# Patient Record
Sex: FEMALE | Race: WHITE | Employment: FULL TIME | URBAN - METROPOLITAN AREA
[De-identification: names, ages, dates, MRNs, and addresses within clinical notes are randomized per-mention and may not be internally consistent; named-entity substitution may affect disease eponyms.]

---

## 2017-02-15 DIAGNOSIS — E03.9 HYPOTHYROIDISM: ICD-10-CM

## 2017-02-15 NOTE — TELEPHONE ENCOUNTER
Pending Prescriptions:                       Disp   Refills    levothyroxine (SYNTHROID/LEVOTHROID) 75 M*90 tab*0            Sig: Take 1 tablet (75 mcg) by mouth daily      Last OV was 12/4/15. Due for AE and thyroid labs. TC to patient. Scheduled AE on 3/13/17. Pt ok on refills right now.     Farida Sanchez RN-BSN      TSH   Date Value Ref Range Status   12/04/2015 2.40 0.40 - 4.00 mU/L Final   ]

## 2017-03-13 ENCOUNTER — OFFICE VISIT (OUTPATIENT)
Dept: MIDWIFE SERVICES | Facility: CLINIC | Age: 42
End: 2017-03-13
Payer: COMMERCIAL

## 2017-03-13 VITALS
SYSTOLIC BLOOD PRESSURE: 108 MMHG | DIASTOLIC BLOOD PRESSURE: 60 MMHG | TEMPERATURE: 97.1 F | BODY MASS INDEX: 19.67 KG/M2 | HEIGHT: 68 IN | WEIGHT: 129.8 LBS

## 2017-03-13 DIAGNOSIS — Z01.419 WELL WOMAN EXAM WITH ROUTINE GYNECOLOGICAL EXAM: Primary | ICD-10-CM

## 2017-03-13 DIAGNOSIS — E06.3 HYPOTHYROIDISM DUE TO HASHIMOTO'S THYROIDITIS: ICD-10-CM

## 2017-03-13 LAB — DEPRECATED CALCIDIOL+CALCIFEROL SERPL-MC: 27 UG/L (ref 20–75)

## 2017-03-13 PROCEDURE — 83718 ASSAY OF LIPOPROTEIN: CPT | Performed by: ADVANCED PRACTICE MIDWIFE

## 2017-03-13 PROCEDURE — 82306 VITAMIN D 25 HYDROXY: CPT | Performed by: ADVANCED PRACTICE MIDWIFE

## 2017-03-13 PROCEDURE — 36415 COLL VENOUS BLD VENIPUNCTURE: CPT | Performed by: ADVANCED PRACTICE MIDWIFE

## 2017-03-13 PROCEDURE — 99396 PREV VISIT EST AGE 40-64: CPT | Performed by: ADVANCED PRACTICE MIDWIFE

## 2017-03-13 PROCEDURE — 84443 ASSAY THYROID STIM HORMONE: CPT | Performed by: ADVANCED PRACTICE MIDWIFE

## 2017-03-13 PROCEDURE — 82465 ASSAY BLD/SERUM CHOLESTEROL: CPT | Performed by: ADVANCED PRACTICE MIDWIFE

## 2017-03-13 NOTE — PROGRESS NOTES
Lilly is a 41 year old  female who presents for annual exam.     Menses are absent lasting 0 days.  Menses flow: light.  No LMP recorded. Patient is not currently having periods (Reason: IUD).. Using IUD for contraception.  She is not currently considering pregnancy.  Besides routine health maintenance, she has no other health concerns today .  Would like thryoid checked today. Likes to continue with Mirena.   GYNECOLOGIC HISTORY:  Menarche: 14  Age at first intercourse: 19-20 Number of lifetime partners: <6  Lilly is sexually active with 1 male partner(s) and is currently in monogamous relationship.    History sexually transmitted infections:No STD history  STI testing offered?  Declined  LEORA exposure: Unknown  History of abnormal Pap smear: NO - age 30-65 PAP every 5 years with negative HPV co-testing recommended  Family history of breast CA: No  Family history of uterine/ovarian CA: No    Family history of colon CA: No    HEALTH MAINTENANCE:  Cholesterol: (  Cholesterol   Date Value Ref Range Status   2015 140 <200 mg/dL Final   2007 167 0 - 200 mg/dL Final     Comment:     LDL Cholesterol is the primary guide to therapy: LDL-cholesterol goal in high   risk patients is <100 mg/dL and in very high risk patients is <70 mg/dL.   The NCEP recommends further evaluation of: patients with cholesterol <200 mg/dL   if additional risk factors are present, cholesterol >240 mg/dL, triglycerides   >150 mg/dL, or HDL <40 mg/dL.    History of abnormal lipids: No  Mammo: N/A . History of abnormal Mammo: Not applicable.  Regular Self Breast Exams: every other month  Calcium/Vitamin D intake: source:  diet Adequate? Yes  TSH: (  TSH   Date Value Ref Range Status   2015 2.40 0.40 - 4.00 mU/L Final    )  Pap; (  Lab Results   Component Value Date    PAP NIL 2015    PAP NIL 2010    PAP NIL 2008    )    HISTORY:  Obstetric History       T3      TAB0   SAB0   E0   M0   L3       #  Outcome Date GA Lbr Lion/2nd Weight Sex Delivery Anes PTL Lv   3 Term 08 39w0d 07:00 8 lb 5 oz (3.771 kg) F  None  Y      Name: Francia   2 Term 05 39w0d  8 lb 3 oz (3.714 kg) F    Y      Name: Luiza   1 Term 10/30/03 39w0d 07:00 7 lb (3.175 kg) F  IV REGIONAL  Y      Name: Leif      Obstetric Comments   S/P breast surgery. Breast fed without difficulty.     Past Medical History   Diagnosis Date     Diffuse cystic mastopathy      last cysts aspiration       Unspecified hypothyroidism           Past Surgical History   Procedure Laterality Date     C nonspecific procedure  10/30/03     Vaginal delivery     C nonspecific procedure  1995     Right breast, multiple bilateral cysts aspiration last      C nonspecific procedure       Left breast tissue removed-probably fibrous tissue     Family History   Problem Relation Age of Onset     GASTROINTESTINAL DISEASE Mother      ibs     Hypertension Father      Lipids Father      Thyroid Disease Father      Depression Father      DIABETES Paternal Grandmother      Depression Brother      Social History     Social History     Marital status:      Spouse name: N/A     Number of children: N/A     Years of education: N/A     Social History Main Topics     Smoking status: Never Smoker     Smokeless tobacco: Not on file     Alcohol use Yes      Comment: occ     Drug use: No     Sexual activity: Yes     Partners: Male     Birth control/ protection: IUD     Other Topics Concern     Parent/Sibling W/ Cabg, Mi Or Angioplasty Before 65f 55m? No     Social History Narrative    Caffeine intake/servings daily - 0    Calcium intake/servings daily - 1-2    Exercise 3-4 times weekly - describe spin, weight    Sunscreen used - Yes    Seatbelts used - Yes    Guns stored in the home - No    Self Breast Exam - Yes    Pap test up to date -  Yes    Eye exam up to date -  No    Dental exam up to date -  Yes    DEXA scan up to date -  Not Applicable    Flex  "Sig/Colonoscopy up to date -  Not Applicable    Mammography up to date -  Yes, 2005    Immunizations reviewed and up to date - Yes    Abuse: Current or Past (Physical, Sexual or Emotional) - No    Do you feel safe in your environment - Yes    Do you cope well with stress - Yes, Most times    Do you suffer from insomnia - No    Last updated by: Maude Moscoso  1/27/2010               Current Outpatient Prescriptions:      levothyroxine (SYNTHROID, LEVOTHROID) 75 MCG tablet, Take 1 tablet (75 mcg) by mouth daily, Disp: 90 tablet, Rfl: 3     IBUPROFEN, 1 tablet as needed., Disp: , Rfl:      MIRENA 20 MCG/24HR IU IUD, insert one into uterus for wonderful contraceptive and non-contraceptive benefits, Disp: one, Rfl: 0     [DISCONTINUED] LEVOTHYROXINE SODIUM 88 MCG OR TABS, ONE TABLET DAILY IN THE MORNING, Disp: 90 tab, Rfl: 3     Allergies   Allergen Reactions     No Known Drug Allergies        Past medical, surgical, social and family history were reviewed and updated in EPIC.    ROS:   C:     NEGATIVE for fever, chills, change in weight  I:       NEGATIVE for worrisome rashes, moles or lesions  E:     NEGATIVE for vision changes or irritation  E/M: NEGATIVE for ear, mouth and throat problems  R:     NEGATIVE for significant cough or SOB  CV:   NEGATIVE for chest pain, palpitations or peripheral edema  GI:     NEGATIVE for nausea, abdominal pain, heartburn, or change in bowel habits  :   NEGATIVE for frequency, dysuria, hematuria, vaginal discharge, or irregular bleeding  M:     NEGATIVE for significant arthralgias or myalgia  N:      NEGATIVE for weakness, dizziness or paresthesias  E:      NEGATIVE for temperature intolerance, skin/hair changes  P:      NEGATIVE for changes in mood or affect.    EXAM:  /60  Temp 97.1  F (36.2  C) (Oral)  Ht 5' 7.5\" (1.715 m)  Wt 129 lb 12.8 oz (58.9 kg)  BMI 20.03 kg/m2   BMI: Body mass index is 20.03 kg/(m^2).  Constitutional: healthy, alert and no distress  Head: " Normocephalic. No masses, lesions, tenderness or abnormalities  Neck: Neck supple. Trachea midline. No adenopathy. Thyroid symmetric, normal size.   Cardiovascular: RRR.   Respiratory: Negative.   Breast: Breasts reveal mild symmetric fibrocystic densities, but there are no dominant, discrete, fixed or suspicious masses found.  Gastrointestinal: Abdomen soft, non-tender, non-distended. No masses, organomegaly.  :  Vulva:  No external lesions, normal female hair distribution, no inguinal adenopathy.    Urethra:  Midline, non-tender, well supported, no discharge  Vagina:  Moist, pink, no abnormal discharge, no lesions  Uterus:  Normal size, anteverted , non-tender, freely mobile  Ovaries:  No masses appreciated, non-tender, mobile  Cervix; IUD strings visualized normal size  Rectal Exam: deferred  Musculoskeletal: extremities normal  Skin: no suspicious lesions or rashes  Psychiatric: Affect appropriate, cooperative,mentation appears normal.     COUNSELING:   Reviewed preventive health counseling, as reflected in patient instructions  Special attention given to:        Regular exercise       Healthy diet/nutrition       (Ashley)menopause management   reports that she has never smoked. She has never used smokeless tobacco.    Body mass index is 20.03 kg/(m^2).    FRAX Risk Assessment    ASSESSMENT:  41 year old female with satisfactory annual exam  (Z01.419) Well woman exam with routine gynecological exam  (primary encounter diagnosis)    Plan: Vitamin D Deficiency, Cholesterol, HDL         cholesterol            (E03.8,  E06.3) Hypothyroidism due to Hashimoto's thyroiditi  Plan: TSH with free T4 reflex            Patient would like TSH tested, stable on her thyroid meds but states have not had it tested in a while.  Wanted to know about when IUD needed to be taken out and has hx of heavy bleeding reviewed that used for perimenopausal bleeding and ok to have replaced in 2020.    Mammogram at [facility].?

## 2017-03-13 NOTE — NURSING NOTE
"Chief Complaint   Patient presents with     Physical       Initial /60  Temp 97.1  F (36.2  C) (Oral)  Ht 5' 7.5\" (1.715 m)  Wt 129 lb 12.8 oz (58.9 kg)  BMI 20.03 kg/m2 Estimated body mass index is 20.03 kg/(m^2) as calculated from the following:    Height as of this encounter: 5' 7.5\" (1.715 m).    Weight as of this encounter: 129 lb 12.8 oz (58.9 kg).  BP completed using cuff size: regular        The following HM Due: mammogram  Vaccinations: tdap      The following patient reported/Care Every where data was sent to:  P ABSTRACT QUALITY INITIATIVES [[id number]]       patient has appointment for today    Shagufta Gonzalez CMA               "

## 2017-03-13 NOTE — MR AVS SNAPSHOT
"              After Visit Summary   3/13/2017    Lilly Peres    MRN: [id number]           Patient Information     Date Of Birth          1975        Visit Information        Provider Department      3/13/2017 3:30 PM Bernice Freeman APRN CNM [facility] [place]        Today's Diagnoses     Well woman exam with routine gynecological exam    -  1    Hypothyroidism due to Hashimoto's thyroiditis           Follow-ups after your visit        Who to contact     If you have questions or need follow up information about today's clinic visit or your schedule please contact [facility] [place] directly at [phone].  Normal or non-critical lab and imaging results will be communicated to you by Blendagramhart, letter or phone within 4 business days after the clinic has received the results. If you do not hear from us within 7 days, please contact the clinic through Blendagramhart or phone. If you have a critical or abnormal lab result, we will notify you by phone as soon as possible.  Submit refill requests through ADVIZE or call your pharmacy and they will forward the refill request to us. Please allow 3 business days for your refill to be completed.          Additional Information About Your Visit        MyChart Information     ADVIZE gives you secure access to your electronic health record. If you see a primary care provider, you can also send messages to your care team and make appointments. If you have questions, please call your primary care clinic.  If you do not have a primary care provider, please call [phone] and they will assist you.        Care EveryWhere ID     This is your Care EveryWhere ID. This could be used by other organizations to access your [place] medical records  [id number]        Your Vitals Were     Temperature Height BMI (Body Mass Index)             97.1  F (36.2  C) (Oral) 5' 7.5\" (1.715 m) 20.03 kg/m2          Blood Pressure from Last 3 Encounters:   03/13/17 108/60 "   12/04/15 104/64   04/22/11 102/60    Weight from Last 3 Encounters:   03/13/17 129 lb 12.8 oz (58.9 kg)   12/04/15 127 lb 12.8 oz (58 kg)   04/22/11 130 lb (59 kg)              We Performed the Following     Cholesterol     HDL cholesterol     TSH with free T4 reflex     Vitamin D Deficiency        Primary Care Provider    None Specified       No primary provider on file.        Thank you!     Thank you for choosing [facility] [place]  for your care. Our goal is always to provide you with excellent care. Hearing back from our patients is one way we can continue to improve our services. Please take a few minutes to complete the written survey that you may receive in the mail after your visit with us. Thank you!             Your Updated Medication List - Protect others around you: Learn how to safely use, store and throw away your medicines at www.disposemymeds.org.          This list is accurate as of: 3/13/17  7:01 PM.  Always use your most recent med list.                   Brand Name Dispense Instructions for use    IBUPROFEN      1 tablet as needed.       levothyroxine 75 MCG tablet    SYNTHROID/LEVOTHROID    90 tablet    Take 1 tablet (75 mcg) by mouth daily       MIRENA (52 MG) 20 MCG/24HR IUD   Generic drug:  levonorgestrel     one    insert one into uterus for wonderful contraceptive and non-contraceptive benefits

## 2017-03-14 DIAGNOSIS — E03.9 HYPOTHYROIDISM: ICD-10-CM

## 2017-03-14 LAB
CHOLEST SERPL-MCNC: 158 MG/DL
HDLC SERPL-MCNC: 58 MG/DL
TSH SERPL DL<=0.005 MIU/L-ACNC: 1.42 MU/L (ref 0.4–4)

## 2017-03-14 RX ORDER — LEVOTHYROXINE SODIUM 75 UG/1
75 TABLET ORAL DAILY
Qty: 90 TABLET | Refills: 3 | Status: SHIPPED | OUTPATIENT
Start: 2017-03-14 | End: 2018-04-03

## 2017-03-14 NOTE — TELEPHONE ENCOUNTER
Signed Prescriptions:                        Disp   Refills    levothyroxine (SYNTHROID/LEVOTHROID) 75 MC*90 tab*3        Sig: Take 1 tablet (75 mcg) by mouth daily  Authorizing Provider: SOLOMON NNUEZ  Ordering User: HAYDE NATION    TSH was normal. Rx sent.  Hayde Nation

## 2017-09-27 ENCOUNTER — TRANSFERRED RECORDS (OUTPATIENT)
Dept: HEALTH INFORMATION MANAGEMENT | Facility: CLINIC | Age: 42
End: 2017-09-27

## 2018-04-03 DIAGNOSIS — E03.9 HYPOTHYROIDISM: ICD-10-CM

## 2018-05-03 RX ORDER — LEVOTHYROXINE SODIUM 75 UG/1
TABLET ORAL
Qty: 90 TABLET | Refills: 0 | Status: SHIPPED | OUTPATIENT
Start: 2018-05-03

## 2018-06-07 ENCOUNTER — OFFICE VISIT (OUTPATIENT)
Dept: MIDWIFE SERVICES | Facility: CLINIC | Age: 43
End: 2018-06-07
Payer: COMMERCIAL

## 2018-06-07 VITALS
HEIGHT: 68 IN | WEIGHT: 132.6 LBS | SYSTOLIC BLOOD PRESSURE: 105 MMHG | HEART RATE: 69 BPM | TEMPERATURE: 98.1 F | DIASTOLIC BLOOD PRESSURE: 66 MMHG | BODY MASS INDEX: 20.1 KG/M2

## 2018-06-07 DIAGNOSIS — Z01.419 ENCOUNTER FOR WELL WOMAN EXAM WITH ROUTINE GYNECOLOGICAL EXAM: ICD-10-CM

## 2018-06-07 DIAGNOSIS — E03.8 OTHER SPECIFIED HYPOTHYROIDISM: Primary | ICD-10-CM

## 2018-06-07 PROCEDURE — 36415 COLL VENOUS BLD VENIPUNCTURE: CPT | Performed by: ADVANCED PRACTICE MIDWIFE

## 2018-06-07 PROCEDURE — 84443 ASSAY THYROID STIM HORMONE: CPT | Performed by: ADVANCED PRACTICE MIDWIFE

## 2018-06-07 PROCEDURE — 99396 PREV VISIT EST AGE 40-64: CPT | Performed by: ADVANCED PRACTICE MIDWIFE

## 2018-06-07 RX ORDER — LEVOTHYROXINE SODIUM 75 UG/1
75 TABLET ORAL DAILY
Qty: 90 TABLET | Refills: 3 | Status: SHIPPED | OUTPATIENT
Start: 2018-06-07

## 2018-06-07 NOTE — NURSING NOTE
"Chief Complaint   Patient presents with     Physical     annual, spotting in mid April, patient has IUD.        Initial /66  Pulse 69  Temp 98.1  F (36.7  C) (Oral)  Ht 5' 7.5\" (1.715 m)  Wt 132 lb 9.6 oz (60.1 kg)  LMP   BMI 20.46 kg/m2 Estimated body mass index is 20.46 kg/(m^2) as calculated from the following:    Height as of this encounter: 5' 7.5\" (1.715 m).    Weight as of this encounter: 132 lb 9.6 oz (60.1 kg).  BP completed using cuff size: regular        The following HM Due: mammogram      The following patient reported/Care Every where data was sent to:  P ABSTRACT QUALITY INITIATIVES [[id number]]  Mammogram done on this date: 17 (approximately), by this group: [facility] , results were Normal      patient has appointment for today  Julienne Valdez                "

## 2018-06-07 NOTE — PROGRESS NOTES
Lilly is a 42 year old  female who presents for annual exam.     Menses are rare and iud has some spotting here and there and NA lasting NA days.  Menses flow: NA.  No LMP recorded. Patient is not currently having periods (Reason: IUD).. Using IUD for contraception.  She is not currently considering pregnancy.  Besides routine health maintenance,  she would like to discuss spotting after iud.  Issues with breasts being different sizes.  Feels somewhat resolved.    May want to change to CN at [facility] because closer to home, has young daughters just starting period and had period in April first time in 3 years with Rizwan.      GYNECOLOGIC HISTORY:  Menarche: 14  Age at first intercourse: 19-20 Number of lifetime partners: <6  Lilly is sexually active with male partner(s) and is currently in monogamous relationship with .    History sexually transmitted infections:No STD history  STI testing offered?  Declined  LEORA exposure: Unknown  History of abnormal Pap smear: NO - age 30- 65 PAP every 3 years recommended  Family history of breast CA: Yes (Please explain): maternal aunt  Family history of uterine/ovarian CA: No    Family history of colon CA: No    HEALTH MAINTENANCE:  Cholesterol: (  Cholesterol   Date Value Ref Range Status   2017 158 <200 mg/dL Final   2015 140 <200 mg/dL Final    History of abnormal lipids: No  Mammo: 2017. History of abnormal Mammo: YES, had a cyst but came back negative.  Regular Self Breast Exams: Yes  Calcium/Vitamin D intake: source:  veggies Adequate? Yes  TSH: (  TSH   Date Value Ref Range Status   2017 1.42 0.40 - 4.00 mU/L Final    )  Pap; (  Lab Results   Component Value Date    PAP NIL 2015    PAP NIL 2010    PAP NIL 2008    )    HISTORY:  Obstetric History       T3      L3     SAB0   TAB0   Ectopic0   Multiple0   Live Births3       # Outcome Date GA Lbr Lion/2nd Weight Sex Delivery Anes PTL Lv   3 Term 08  39w0d 07:00 8 lb 5 oz (3.771 kg) F  None  YOVANNY      Name: Francia   2 Term 05 39w0d  8 lb 3 oz (3.714 kg) F    YOVANNY      Name: Luiza   1 Term 10/30/03 39w0d 07:00 7 lb (3.175 kg) F  IV REGIONAL  YOVANNY      Name: Leif      Obstetric Comments   S/P breast surgery. Breast fed without difficulty.     Past Medical History:   Diagnosis Date     Diffuse cystic mastopathy     last cysts aspiration       Unspecified hypothyroidism          Past Surgical History:   Procedure Laterality Date     C NONSPECIFIC PROCEDURE  10/30/03    Vaginal delivery     C NONSPECIFIC PROCEDURE  1995    Right breast, multiple bilateral cysts aspiration last      C NONSPECIFIC PROCEDURE      Left breast tissue removed-probably fibrous tissue     Family History   Problem Relation Age of Onset     GASTROINTESTINAL DISEASE Mother      ibs     Hypertension Father      Lipids Father      Thyroid Disease Father      Depression Father      DIABETES Paternal Grandmother      Depression Brother      Social History     Social History     Marital status:      Spouse name: N/A     Number of children: N/A     Years of education: N/A     Social History Main Topics     Smoking status: Never Smoker     Smokeless tobacco: Never Used     Alcohol use Yes      Comment: occ     Drug use: No     Sexual activity: Yes     Partners: Male     Birth control/ protection: IUD      Comment: Mirena.      Other Topics Concern     Parent/Sibling W/ Cabg, Mi Or Angioplasty Before 65f 55m? No     Social History Narrative    Caffeine intake/servings daily - 0    Calcium intake/servings daily - 1-2    Exercise 3-4 times weekly - describe spin, weight    Sunscreen used - Yes    Seatbelts used - Yes    Guns stored in the home - No    Self Breast Exam - Yes    Pap test up to date -  Yes    Eye exam up to date -  No    Dental exam up to date -  Yes    DEXA scan up to date -  Not Applicable    Flex Sig/Colonoscopy up to date -  Not Applicable     "Mammography up to date -  Yes, 2005    Immunizations reviewed and up to date - Yes    Abuse: Current or Past (Physical, Sexual or Emotional) - No    Do you feel safe in your environment - Yes    Do you cope well with stress - Yes, Most times    Do you suffer from insomnia - No    Last updated by: Maude Moscoso  1/27/2010               Current Outpatient Prescriptions:      IBUPROFEN, 1 tablet as needed., Disp: , Rfl:      levothyroxine (SYNTHROID/LEVOTHROID) 75 MCG tablet, TAKE 1 TABLET BY MOUTH DAILY, Disp: 90 tablet, Rfl: 0     MIRENA 20 MCG/24HR IU IUD, insert one into uterus for wonderful contraceptive and non-contraceptive benefits, Disp: one, Rfl: 0     Allergies   Allergen Reactions     No Known Drug Allergies        Past medical, surgical, social and family history were reviewed and updated in EPIC.    ROS:   C:     NEGATIVE for fever, chills, change in weight  I:       NEGATIVE for worrisome rashes, moles or lesions  E:     NEGATIVE for vision changes or irritation  E/M: NEGATIVE for ear, mouth and throat problems  R:     NEGATIVE for significant cough or SOB  CV:   NEGATIVE for chest pain, palpitations or peripheral edema  GI:     NEGATIVE for nausea, abdominal pain, heartburn, or change in bowel habits  :   NEGATIVE for frequency, dysuria, hematuria, vaginal discharge, or irregular bleeding  M:     NEGATIVE for significant arthralgias or myalgia  N:      NEGATIVE for weakness, dizziness or paresthesias  E:      NEGATIVE for temperature intolerance, skin/hair changes  P:      NEGATIVE for changes in mood or affect.    EXAM:  /66  Pulse 69  Temp 98.1  F (36.7  C) (Oral)  Ht 5' 7.5\" (1.715 m)  Wt 132 lb 9.6 oz (60.1 kg)  LMP   BMI 20.46 kg/m2   BMI: Body mass index is 20.46 kg/(m^2).  Constitutional: healthy, alert and no distress  Head: Normocephalic. No masses, lesions, tenderness or abnormalities  Neck: Neck supple. Trachea midline. No adenopathy. Thyroid symmetric, normal size. "   Cardiovascular: RRR.   Respiratory: Negative.   Breast: Breasts reveal mild symmetric fibrocystic densities, but there are no dominant, discrete, fixed or suspicious masses found.  Gastrointestinal: Abdomen soft, non-tender, non-distended. No masses, organomegaly.  :  Vulva:  No external lesions, normal female hair distribution, no inguinal adenopathy.    Urethra:  Midline, non-tender, well supported, no discharge  Vagina:  Moist, pink, no abnormal discharge, no lesions  Uterus:  Normal size, retroverted , non-tender, freely mobile  Ovaries:  No masses appreciated, non-tender on right slightly  mobile  Rectal Exam: deferred  Musculoskeletal: extremities normal  Skin: no suspicious lesions or rashes  Psychiatric: Affect appropriate, cooperative,mentation appears normal.     COUNSELING:   Reviewed preventive health counseling, as reflected in patient instructions  Special attention given to:        (Ashley)menopause management   reports that she has never smoked. She has never used smokeless tobacco.    Body mass index is 20.46 kg/(m^2).    FRAX Risk Assessment    ASSESSMENT:  42 year old female with satisfactory annual exam  (E03.8) Other specified hypothyroidism  (primary encounter diagnosis)  Comment:  Plan: levothyroxine (SYNTHROID/LEVOTHROID) 75 MCG         tablet, TSH with free T4 reflex        Would like refill of levothroid medicine feels stable on this dose but thinks also wants TSH drawn today to see level. Some occasional perimenopausal symptoms but all ok.   Discussed young teen children and it they should see provider, is considering [facility] because close and enc. To see CNM service there.     rtc as needed.

## 2018-06-07 NOTE — MR AVS SNAPSHOT
"              After Visit Summary   6/7/2018    Lilly Peres    MRN: [id number]           Patient Information     Date Of Birth          1975        Visit Information        Provider Department      6/7/2018 2:00 PM Bernice Freeman APRN CN[facility]        Today's Diagnoses     Other specified hypothyroidism    -  1    Encounter for well woman exam with routine gynecological exam           Follow-ups after your visit        Who to contact     If you have questions or need follow up information about today's clinic visit or your schedule please contact [facility] directly at [phone].  Normal or non-critical lab and imaging results will be communicated to you by Captricityhart, letter or phone within 4 business days after the clinic has received the results. If you do not hear from us within 7 days, please contact the clinic through Mirage Network[employer] or phone. If you have a critical or abnormal lab result, we will notify you by phone as soon as possible.  Submit refill requests through Beyond the Box or call your pharmacy and they will forward the refill request to us. Please allow 3 business days for your refill to be completed.          Additional Information About Your Visit        MyChart Information     Beyond the Box gives you secure access to your electronic health record. If you see a primary care provider, you can also send messages to your care team and make appointments. If you have questions, please call your primary care clinic.  If you do not have a primary care provider, please call [phone] and they will assist you.        Care EveryWhere ID     This is your Care EveryWhere ID. This could be used by other organizations to access your [facility] medical records  [id number]        Your Vitals Were     Pulse Temperature Height BMI (Body Mass Index)          69 98.1  F (36.7  C) (Oral) 5' 7.5\" (1.715 m) 20.46 kg/m2         Blood Pressure from Last 3 Encounters:   06/07/18 105/66 "   03/13/17 108/60   12/04/15 104/64    Weight from Last 3 Encounters:   06/07/18 132 lb 9.6 oz (60.1 kg)   03/13/17 129 lb 12.8 oz (58.9 kg)   12/04/15 127 lb 12.8 oz (58 kg)              We Performed the Following     TSH with free T4 reflex          Today's Medication Changes          These changes are accurate as of 6/7/18  3:17 PM.  If you have any questions, ask your nurse or doctor.               These medicines have changed or have updated prescriptions.        Dose/Directions    * levothyroxine 75 MCG tablet   Commonly known as:  SYNTHROID/LEVOTHROID   This may have changed:  Another medication with the same name was added. Make sure you understand how and when to take each.   Used for:  Hypothyroidism        TAKE 1 TABLET BY MOUTH DAILY   Quantity:  90 tablet   Refills:  0       * levothyroxine 75 MCG tablet   Commonly known as:  SYNTHROID/LEVOTHROID   This may have changed:  You were already taking a medication with the same name, and this prescription was added. Make sure you understand how and when to take each.   Used for:  Other specified hypothyroidism        Dose:  75 mcg   Take 1 tablet (75 mcg) by mouth daily   Quantity:  90 tablet   Refills:  3       * Notice:  This list has 2 medication(s) that are the same as other medications prescribed for you. Read the directions carefully, and ask your doctor or other care provider to review them with you.         Where to get your medicines      These medications were sent to [place] [address] [facility] - [place], [place] - [address]  [address] [facility] [address]     Phone:  [phone]     levothyroxine 75 MCG tablet                Primary Care Provider Office Phone # Fax #    [facility] [phone]       [address] [facility] [address]  [facility] [address]        Equal Access to Services     CHARLENE ARROYO AH: keith Lees, julia jordan. So [facility]  [phone].    ATENCIÓN: Si lorin gallardo, tiene a almanzar disposición servicios gratuitos de asistencia lingüística. Donato andrade [phone].    We comply with applicable federal civil rights laws and [place] laws. We do not discriminate on the basis of race, color, national origin, age, disability, sex, sexual orientation, or gender identity.            Thank you!     Thank you for choosing [facility]  for your care. Our goal is always to provide you with excellent care. Hearing back from our patients is one way we can continue to improve our services. Please take a few minutes to complete the written survey that you may receive in the mail after your visit with us. Thank you!             Your Updated Medication List - Protect others around you: Learn how to safely use, store and throw away your medicines at www.disposemymeds.org.          This list is accurate as of 6/7/18  3:17 PM.  Always use your most recent med list.                   Brand Name Dispense Instructions for use Diagnosis    IBUPROFEN      1 tablet as needed.        * levothyroxine 75 MCG tablet    SYNTHROID/LEVOTHROID    90 tablet    TAKE 1 TABLET BY MOUTH DAILY    Hypothyroidism       * levothyroxine 75 MCG tablet    SYNTHROID/LEVOTHROID    90 tablet    Take 1 tablet (75 mcg) by mouth daily    Other specified hypothyroidism       MIRENA (52 MG) 20 MCG/24HR IUD   Generic drug:  levonorgestrel     one    insert one into uterus for wonderful contraceptive and non-contraceptive benefits    Presence of intrauterine contraceptive device, Insertion of IUD       * Notice:  This list has 2 medication(s) that are the same as other medications prescribed for you. Read the directions carefully, and ask your doctor or other care provider to review them with you.

## 2018-06-07 NOTE — Clinical Note
Mammogram done on this date: 9/27/17 (approximately), by this group: [facility] , results were Normal

## 2018-06-08 LAB — TSH SERPL DL<=0.005 MIU/L-ACNC: 3.23 MU/L (ref 0.4–4)

## 2018-10-24 ENCOUNTER — TRANSFERRED RECORDS (OUTPATIENT)
Dept: HEALTH INFORMATION MANAGEMENT | Facility: CLINIC | Age: 43
End: 2018-10-24

## 2018-11-14 ENCOUNTER — THERAPY VISIT (OUTPATIENT)
Dept: PHYSICAL THERAPY | Facility: CLINIC | Age: 43
End: 2018-11-14
Payer: COMMERCIAL

## 2018-11-14 DIAGNOSIS — M76.31 ILIOTIBIAL BAND SYNDROME, RIGHT: Primary | ICD-10-CM

## 2018-11-14 PROCEDURE — 97110 THERAPEUTIC EXERCISES: CPT | Mod: GP | Performed by: PHYSICAL THERAPIST

## 2018-11-14 PROCEDURE — 97161 PT EVAL LOW COMPLEX 20 MIN: CPT | Mod: GP | Performed by: PHYSICAL THERAPIST

## 2018-11-14 ASSESSMENT — ACTIVITIES OF DAILY LIVING (ADL)
GOING_UP_1_FLIGHT_OF_STAIRS: NO DIFFICULTY AT ALL
STAND: ACTIVITY IS NOT DIFFICULT
HOS_ADL_HIGHEST_POTENTIAL_SCORE: 68
WEAKNESS: THE SYMPTOM AFFECTS MY ACTIVITY MODERATELY
HOS_ADL_ITEM_SCORE_TOTAL: 65
RISE FROM A CHAIR: ACTIVITY IS MINIMALLY DIFFICULT
HEAVY_WORK: NO DIFFICULTY AT ALL
GOING_DOWN_1_FLIGHT_OF_STAIRS: NO DIFFICULTY AT ALL
WALKING_DOWN_STEEP_HILLS: NO DIFFICULTY AT ALL
GETTING_INTO_AND_OUT_OF_A_BATHTUB: NO DIFFICULTY AT ALL
GO DOWN STAIRS: ACTIVITY IS NOT DIFFICULT
STANDING_FOR_15_MINUTES: NO DIFFICULTY AT ALL
GETTING_INTO_AND_OUT_OF_AN_AVERAGE_CAR: NO DIFFICULTY AT ALL
AS_A_RESULT_OF_YOUR_KNEE_INJURY,_HOW_WOULD_YOU_RATE_YOUR_CURRENT_LEVEL_OF_DAILY_ACTIVITY?: NEARLY NORMAL
HOW_WOULD_YOU_RATE_THE_OVERALL_FUNCTION_OF_YOUR_KNEE_DURING_YOUR_USUAL_DAILY_ACTIVITIES?: NEARLY NORMAL
HOW_WOULD_YOU_RATE_YOUR_CURRENT_LEVEL_OF_FUNCTION_DURING_YOUR_USUAL_ACTIVITIES_OF_DAILY_LIVING_FROM_0_TO_100_WITH_100_BEING_YOUR_LEVEL_OF_FUNCTION_PRIOR_TO_YOUR_HIP_PROBLEM_AND_0_BEING_THE_INABILITY_TO_PERFORM_ANY_OF_YOUR_USUAL_DAILY_ACTIVITIES?: 85
GIVING WAY, BUCKLING OR SHIFTING OF KNEE: THE SYMPTOM AFFECTS MY ACTIVITY MODERATELY
WALKING_15_MINUTES_OR_GREATER: NO DIFFICULTY AT ALL
PUTTING_ON_SOCKS_AND_SHOES: NO DIFFICULTY AT ALL
TWISTING/PIVOTING_ON_INVOLVED_LEG: SLIGHT DIFFICULTY
KNEEL ON THE FRONT OF YOUR KNEE: ACTIVITY IS MINIMALLY DIFFICULT
DEEP_SQUATTING: SLIGHT DIFFICULTY
KNEE_ACTIVITY_OF_DAILY_LIVING_SCORE: 78.57
SIT WITH YOUR KNEE BENT: ACTIVITY IS NOT DIFFICULT
GO UP STAIRS: ACTIVITY IS MINIMALLY DIFFICULT
HOS_ADL_SCORE(%): 95.59
WALKING_APPROXIMATELY_10_MINUTES: NO DIFFICULTY AT ALL
SITTING_FOR_15_MINUTES: NO DIFFICULTY AT ALL
WALK: ACTIVITY IS MINIMALLY DIFFICULT
LIMPING: I HAVE THE SYMPTOM BUT IT DOES NOT AFFECT MY ACTIVITY
HOS_ADL_COUNT: 17
PAIN: THE SYMPTOM AFFECTS MY ACTIVITY SLIGHTLY
SWELLING: I DO NOT HAVE THE SYMPTOM
WALKING_INITIALLY: NO DIFFICULTY AT ALL
ROLLING_OVER_IN_BED: NO DIFFICULTY AT ALL
SQUAT: ACTIVITY IS MINIMALLY DIFFICULT
LIGHT_TO_MODERATE_WORK: NO DIFFICULTY AT ALL
STIFFNESS: I HAVE THE SYMPTOM BUT IT DOES NOT AFFECT MY ACTIVITY
HOW_WOULD_YOU_RATE_THE_CURRENT_FUNCTION_OF_YOUR_KNEE_DURING_YOUR_USUAL_DAILY_ACTIVITIES_ON_A_SCALE_FROM_0_TO_100_WITH_100_BEING_YOUR_LEVEL_OF_KNEE_FUNCTION_PRIOR_TO_YOUR_INJURY_AND_0_BEING_THE_INABILITY_TO_PERFORM_ANY_OF_YOUR_USUAL_DAILY_ACTIVITIES?: 85
RECREATIONAL_ACTIVITIES: SLIGHT DIFFICULTY
RAW_SCORE: 55
WALKING_UP_STEEP_HILLS: NO DIFFICULTY AT ALL
KNEE_ACTIVITY_OF_DAILY_LIVING_SUM: 55
STEPPING_UP_AND_DOWN_CURBS: NO DIFFICULTY AT ALL

## 2018-11-14 NOTE — LETTER
[facility] PHYSICAL THERAPY  [address]  [facility] [address]  [phone]    2018    Re: Lilly Peres   :   1975  MRN:  [id number]   REFERRING PHYSICIAN:   Harsh Schaffer    [facility] PH[facility]ICAL [facility]    Date of Initial Evaluation:    Visits:  Rxs Used: 1  Reason for Referral:  Iliotibial band syndrome, right    EVALUATION SUMMARY    [facility] Initial Evaluation    Subjective:  Patient is a 43 year old female presenting with rehab right knee hpi.   Pt reports insidious onset of R knee pain and feelings of buckling over the summer 2018. She has had more pain in the past month, Oct 2018 with shooting anterior knee and thigh pain .   She had not run for 3 years related to a health issue. She did return to run this summer 2018. She also had been walking, and taking [place] class, and yoga. .    and reported as 3/10.   Pain is worse during the day.  Symptoms are exacerbated by activity, walking, bending/squatting, ascending stairs, kneeling and transfers and relieved by rest.  Since onset symptoms are gradually worsening.        General health as reported by patient is excellent.                Red flags:  None as reported by the patient.  General health as reported by patient is excellent.    Medical allergies: no.    Current medications:  Thyroid medication and steroids.  Current occupation is .    Primary job tasks include:  Other and prolonged sitting (Computer Work).  Knee Activity of Daily Living Score: 78.57        Objective:  Standing Alignment:    Lumbar:  Lordosis decr  Gait:  Dec WTB R, dec push off R, dec stride length R  Deviations:  Knee:  Knee extension decr R  Non-Weight Bearing:    Knee:  Tibial varus R  Flexibility/Screens:   Positive screens:  Hip (Mod loss R hip ER and IR with firm end feel ER. painful end feel IR with inc in R knee  pain ) and Lumbar  (mod loss lumbar ext, mod loss R and L lumbar SB, min loss flexion)  Lower Extremity:  Decreased left lower extremity flexibility:Piriformis and Hip Flexors  Decreased right lower extremity flexibility:  Hip IR's; Hip ER's; Piriformis; Hip Flexors and Quadriceps  Spine:  Decreased left spine flexibility:  Quadratus Lumborum  Re: Lilly Peres   :   1975    Decreased right spine flexibility:  Quadratus Lumborum  Knee Evaluation:  ROM:  AROM: normal    PROM  Hyperextension: Left:   Right:  Min loss VS L,   Palpation:    Right knee tenderness present at:  IT Band (distal at VL )  Edema:  Normal  Functional Testing:    Quad:    Single Leg Squat:  Left:       Right:      R SLS lumbar ext with mod loss of control and trunk rotation   Moderate loss of control  Bilateral Leg Squat:        Assessment/Plan:    Patient is a 43 year old female with right side knee complaints.    Patient has the following significant findings with corresponding treatment plan.                Diagnosis 1:  R distal ITBFS  Pain -  hot/cold therapy, manual therapy and self management  Decreased ROM/flexibility - manual therapy and therapeutic exercise  Decreased joint mobility - manual therapy and therapeutic exercise  Impaired gait - gait training  Impaired muscle performance - neuro re-education  Decreased function - therapeutic activities  Therapy Evaluation Codes:   1) History comprised of:   Personal factors that impact the plan of care:      None.    Comorbidity factors that impact the plan of care are:      None.     Medications impacting care: None.  2) Examination of Body Systems comprised of:   Body structures and functions that impact the plan of care:      Hip, Knee and Lumbar spine.   Activity limitations that impact the plan of care are:      Bending, Running, Sitting, Squatting/kneeling, Stairs and Walking.  3) Clinical presentation characteristics are:   Stable/Uncomplicated.  4) Decision-Making    Low complexity using  standardized patient assessment instrument and/or measureable assessment of functional outcome.  Cumulative Therapy Evaluation is: Low complexity.  Previous and current functional limitations:  (See Goal Flow Sheet for this information)    Short term and Long term goals: (See Goal Flow Sheet for this information)   Communication ability:  Patient appears to be able to clearly communicate and understand verbal and written communication and follow directions correctly.  Treatment Explanation - The following has been discussed with the patient:   RX ordered/plan of care  Re: Lilly Peres   :   1975    Anticipated outcomes  Possible risks and side effects  This patient would benefit from PT intervention to resume normal activities.   Rehab potential is excellent.    Frequency:  1 X week, once daily  Duration:  for 6 weeks  Discharge Plan:  Achieve all LTG.  Independent in home treatment program.  Reach maximal therapeutic benefit.                Thank you for your referral.    INQUIRIES  Therapist: Bernice King, PT, [facility]  [facility]  [address] [place], [place] [address] [address]  Phone: [phone]  Fax: [phone]

## 2018-11-14 NOTE — MR AVS SNAPSHOT
After Visit Summary   11/14/2018    Lilly Peres    MRN: [id number]           Patient Information     Date Of Birth          1975        Visit Information        Provider Department      11/14/2018 9:20 AM Bernice King, PT [place] [facility] Physical Therapy        Today's Diagnoses     Iliotibial band syndrome, right    -  1       Follow-ups after your visit        Your next 10 appointments already scheduled     Dec 06, 2018  3:10 PM CST   [place]OS[facility] Running with Bernice King PT   [facility] Physical Therapy ([place]  )    [address]  [facility] [id number] [phone]            Dec 19, 2018  8:40 AM CST   JOSÉ Running with Bernice King PT   [place] [facility] Physical Therapy ([place]  )    [address] [place] [address] [address]  [facility] [id number] [phone]              Who to contact     If you have questions or need follow up information about today's clinic visit or your schedule please contact [facility] PHYSICAL THERAPY directly at [phone].  Normal or non-critical lab and imaging results will be communicated to you by trip.mehart, letter or phone within 4 business days after the clinic has received the results. If you do not hear from us within 7 days, please contact the clinic through [email or web address] or phone. If you have a critical or abnormal lab result, we will notify you by phone as soon as possible.  Submit refill requests through Spreaker or call your pharmacy and they will forward the refill request to us. Please allow 3 business days for your refill to be completed.          Additional Information About Your Visit        MyChart Information     Spreaker gives you secure access to your electronic health record. If you see a primary care provider, you can also send messages to your care team and make appointments. If you have questions, please call your  primary care clinic.  If you do not have a primary care provider, please call [phone] and they will assist you.        Care EveryWhere ID     This is your Care EveryWhere ID. This could be used by other organizations to access your [facility] medical records  [id number]         Blood Pressure from Last 3 Encounters:   06/07/18 105/66   03/13/17 108/60   12/04/15 104/64    Weight from Last 3 Encounters:   06/07/18 60.1 kg (132 lb 9.6 oz)   03/13/17 58.9 kg (129 lb 12.8 oz)   12/04/15 58 kg (127 lb 12.8 oz)              We Performed the Following     HC PT EVAL, LOW COMPLEXITY     JOS[facility] INITIAL EVAL REPORT     THERAPEUTIC EXERCISES        Primary Care Provider Office Phone # Fax #    [facility] [phone]       [address] [address]        Equal Access to Services     CHARLENE ARROYO : Hadii aad ku hadasho Soalexisali, waaxda luqadaha, qaybta kaalmada adeegyada, waxay arjunin hayannin lex wei . So [facility] [phone].    ATENCIÓN: Si habla español, tiene a almanzar disposición servicios gratuitos de asistencia lingüística. Llame al [phone].    We comply with applicable federal civil rights laws and [place] laws. We do not discriminate on the basis of race, color, national origin, age, disability, sex, sexual orientation, or gender identity.            Thank you!     Thank you for choosing [facility] PHYSICAL THERAPY  for your care. Our goal is always to provide you with excellent care. Hearing back from our patients is one way we can continue to improve our services. Please take a few minutes to complete the written survey that you may receive in the mail after your visit with us. Thank you!             Your Updated Medication List - Protect others around you: Learn how to safely use, store and throw away your medicines at www.disposemymeds.org.          This list is accurate as of 11/14/18 11:59 PM.  Always use your most  recent med list.                   Brand Name Dispense Instructions for use Diagnosis    IBUPROFEN      1 tablet as needed.        * levothyroxine 75 MCG tablet    SYNTHROID/LEVOTHROID    90 tablet    TAKE 1 TABLET BY MOUTH DAILY    Hypothyroidism       * levothyroxine 75 MCG tablet    SYNTHROID/LEVOTHROID    90 tablet    Take 1 tablet (75 mcg) by mouth daily    Other specified hypothyroidism       MIRENA (52 MG) 20 MCG/24HR IUD   Generic drug:  levonorgestrel     one    insert one into uterus for wonderful contraceptive and non-contraceptive benefits    Presence of intrauterine contraceptive device, Insertion of IUD       * Notice:  This list has 2 medication(s) that are the same as other medications prescribed for you. Read the directions carefully, and ask your doctor or other care provider to review them with you.

## 2018-11-19 PROBLEM — M76.31 ILIOTIBIAL BAND SYNDROME, RIGHT: Status: ACTIVE | Noted: 2018-11-19

## 2018-11-19 NOTE — PROGRESS NOTES
[place] [facility] Initial Evaluation  Subjective:  Patient is a 43 year old female presenting with rehab right knee hpi.           Pt reports insidious onset of R knee pain and feelings of buckling over the summer 2018. She has had more pain in the past month, Oct 2018 with shooting anterior knee and thigh pain .   She had not run for 3 years related to a health issue. She did return to run this summer 2018. She also had been walking, and taking [place] class, and yoga. .          and reported as 3/10.   Pain is worse during the day.  Symptoms are exacerbated by activity, walking, bending/squatting, ascending stairs, kneeling and transfers and relieved by rest.  Since onset symptoms are gradually worsening.        General health as reported by patient is excellent.                      Red flags:  None as reported by the patient.                        Objective:  Standing Alignment:        Lumbar:  Lordosis decr            Gait:  Dec WTB R, dec push off R, dec stride length R    Deviations:  Knee:  Knee extension decr R  Non-Weight Bearing:        Knee:  Tibial varus R    Flexibility/Screens:   Positive screens:  Hip (Mod loss R hip ER and IR with firm end feel ER. painful end feel IR with inc in R knee  pain ) and Lumbar (mod loss lumbar ext, mod loss R and L lumbar SB, min loss flexion)    Lower Extremity:  Decreased left lower extremity flexibility:Piriformis and Hip Flexors    Decreased right lower extremity flexibility:  Hip IR's; Hip ER's; Piriformis; Hip Flexors and Quadriceps  Spine:  Decreased left spine flexibility:  Quadratus Lumborum    Decreased right spine flexibility:  Quadratus Lumborum                                                    Knee Evaluation:  ROM:  AROM: normal      PROM    Hyperextension: Left:   Right:  Min loss VS L,                 Palpation:      Right knee tenderness present at:  IT Band (distal at VL )  Edema:  Normal      Functional Testing:          Quad:    Single  Leg Squat:  Left:       Right:      R SLS lumbar ext with mod loss of control and trunk rotation   Moderate loss of control  Bilateral Leg Squat:                General     ROS    Assessment/Plan:    Patient is a 43 year old female with right side knee complaints.    Patient has the following significant findings with corresponding treatment plan.                Diagnosis 1:  R distal ITBFS  Pain -  hot/cold therapy, manual therapy and self management  Decreased ROM/flexibility - manual therapy and therapeutic exercise  Decreased joint mobility - manual therapy and therapeutic exercise  Impaired gait - gait training  Impaired muscle performance - neuro re-education  Decreased function - therapeutic activities    Therapy Evaluation Codes:   1) History comprised of:   Personal factors that impact the plan of care:      None.    Comorbidity factors that impact the plan of care are:      None.     Medications impacting care: None.  2) Examination of Body Systems comprised of:   Body structures and functions that impact the plan of care:      Hip, Knee and Lumbar spine.   Activity limitations that impact the plan of care are:      Bending, Running, Sitting, Squatting/kneeling, Stairs and Walking.  3) Clinical presentation characteristics are:   Stable/Uncomplicated.  4) Decision-Making    Low complexity using standardized patient assessment instrument and/or measureable assessment of functional outcome.  Cumulative Therapy Evaluation is: Low complexity.    Previous and current functional limitations:  (See Goal Flow Sheet for this information)    Short term and Long term goals: (See Goal Flow Sheet for this information)     Communication ability:  Patient appears to be able to clearly communicate and understand verbal and written communication and follow directions correctly.  Treatment Explanation - The following has been discussed with the patient:   RX ordered/plan of care  Anticipated outcomes  Possible risks and  side effects  This patient would benefit from PT intervention to resume normal activities.   Rehab potential is excellent.    Frequency:  1 X week, once daily  Duration:  for 6 weeks  Discharge Plan:  Achieve all LTG.  Independent in home treatment program.  Reach maximal therapeutic benefit.    Please refer to the daily flowsheet for treatment today, total treatment time and time spent performing 1:1 timed codes.

## 2018-11-20 NOTE — PROGRESS NOTES
[place] [facility] Initial Evaluation  Subjective:  Patient is a 43 year old female presenting with rehab left ankle/foot hpi.                                    General health as reported by patient is excellent.    Medical allergies: no.    Current medications:  Thyroid medication and steroids.  Current occupation is .    Primary job tasks include:  Other and prolonged sitting (Computer Work).                   Knee Activity of Daily Living Score: 78.57            Objective:  System    Physical Exam    General     ROS    Assessment/Plan:

## 2018-12-06 ENCOUNTER — THERAPY VISIT (OUTPATIENT)
Dept: PHYSICAL THERAPY | Facility: CLINIC | Age: 43
End: 2018-12-06
Payer: COMMERCIAL

## 2018-12-06 DIAGNOSIS — M76.31 ILIOTIBIAL BAND SYNDROME, RIGHT: ICD-10-CM

## 2018-12-06 PROCEDURE — 97140 MANUAL THERAPY 1/> REGIONS: CPT | Mod: GP | Performed by: PHYSICAL THERAPIST

## 2018-12-06 PROCEDURE — 97110 THERAPEUTIC EXERCISES: CPT | Mod: GP | Performed by: PHYSICAL THERAPIST

## 2018-12-19 ENCOUNTER — THERAPY VISIT (OUTPATIENT)
Dept: PHYSICAL THERAPY | Facility: CLINIC | Age: 43
End: 2018-12-19
Payer: COMMERCIAL

## 2018-12-19 DIAGNOSIS — M76.31 ILIOTIBIAL BAND SYNDROME, RIGHT: ICD-10-CM

## 2018-12-19 PROCEDURE — 97140 MANUAL THERAPY 1/> REGIONS: CPT | Mod: GP | Performed by: PHYSICAL THERAPIST

## 2018-12-19 PROCEDURE — 97110 THERAPEUTIC EXERCISES: CPT | Mod: GP | Performed by: PHYSICAL THERAPIST

## 2019-07-03 DIAGNOSIS — E03.8 OTHER SPECIFIED HYPOTHYROIDISM: ICD-10-CM

## 2019-07-03 NOTE — TELEPHONE ENCOUNTER
Last OV 6/2018.  Pt needs to schedule annual exam.  LMTC.  Once pt has scheduled, refill can be sent that will last until she is seen.  Michelle Cuba RN

## 2019-09-16 RX ORDER — LEVOTHYROXINE SODIUM 75 UG/1
TABLET ORAL
Qty: 90 TABLET | Refills: 0 | OUTPATIENT
Start: 2019-09-16

## 2019-09-16 NOTE — TELEPHONE ENCOUNTER
Response sent to pharmacy.  Pt has not scheduled appt.  Refusing refill until we have heard from pt.  Michelle Cuba RN

## 2019-12-16 ENCOUNTER — HEALTH MAINTENANCE LETTER (OUTPATIENT)
Age: 44
End: 2019-12-16

## 2020-03-03 ENCOUNTER — OFFICE VISIT (OUTPATIENT)
Dept: MIDWIFE SERVICES | Facility: CLINIC | Age: 45
End: 2020-03-03
Payer: COMMERCIAL

## 2020-03-03 VITALS
HEART RATE: 74 BPM | WEIGHT: 133 LBS | DIASTOLIC BLOOD PRESSURE: 64 MMHG | BODY MASS INDEX: 20.52 KG/M2 | SYSTOLIC BLOOD PRESSURE: 97 MMHG

## 2020-03-03 DIAGNOSIS — Z30.430 ENCOUNTER FOR IUD INSERTION: ICD-10-CM

## 2020-03-03 DIAGNOSIS — Z97.5 IUD (INTRAUTERINE DEVICE) IN PLACE: ICD-10-CM

## 2020-03-03 DIAGNOSIS — Z30.432 ENCOUNTER FOR IUD REMOVAL: Primary | ICD-10-CM

## 2020-03-03 PROCEDURE — 58300 INSERT INTRAUTERINE DEVICE: CPT | Performed by: ADVANCED PRACTICE MIDWIFE

## 2020-03-03 PROCEDURE — 58301 REMOVE INTRAUTERINE DEVICE: CPT | Performed by: ADVANCED PRACTICE MIDWIFE

## 2020-03-03 NOTE — PROGRESS NOTES
Chief Complaint/ History of Present Illness    PATIENT HERE FOR REMOVAL AND REPLACEMENT OF MIRENA IUD     :Lilly Peres is a 44 year old  female.   No LMP recorded. (Menstrual status: IUD)..  Today's pregnancy test negative.  She is here for an IUD insertion.  Patient has been given written information.  I have reviewed the risks of the IUD including pregnancy, PID, life threatening infection, perforation, expulsion, cramping, changes in bleeding and ovarian cysts. Benefits of the IUD and alternative family planning methods have been discussed.  Patients questions are answered.  Patient has verbalized understanding of risks and benefits and has signed the consent form.  Allergies   Allergen Reactions     No Known Drug Allergies      Current Outpatient Medications   Medication Sig Dispense Refill     IBUPROFEN 1 tablet as needed.       levonorgestrel (MIRENA) 20 MCG/24HR IUD 1 each (20 mcg) by Intrauterine route once for 1 dose 1 each 0     levothyroxine (SYNTHROID/LEVOTHROID) 75 MCG tablet Take 1 tablet (75 mcg) by mouth daily 90 tablet 3     levothyroxine (SYNTHROID/LEVOTHROID) 75 MCG tablet TAKE 1 TABLET BY MOUTH DAILY 90 tablet 0      Past Medical History:   Diagnosis Date     Diffuse cystic mastopathy     last cysts aspiration  2002     Unspecified hypothyroidism     2007     Past Surgical History:   Procedure Laterality Date     C NONSPECIFIC PROCEDURE  10/30/03    Vaginal delivery     C NONSPECIFIC PROCEDURE  5/1995    Right breast, multiple bilateral cysts aspiration last 2002     C NONSPECIFIC PROCEDURE  5/95    Left breast tissue removed-probably fibrous tissue     REVIEW OF SYSTEMS  CONSTITUTIONAL: Denies fever, chills and night sweats  BREASTS:  Denies discharge and lumps.    HEART/LUNGS: Denies dyspnea, wheezing, coughing and chest pain.  HEMATOLOGIC: Denies tendency to bruise and history of blood clots.  GENITOURINARY:  Denies urgency, dysuria and hematuria.  NEUROLOGIC:  Denies seizures,  weakness and fainting.  PSYCHIATRIC: Negative for changes in mood or affect    EXAM:  BP 97/64   Pulse 74   Wt 60.3 kg (133 lb)   BMI 20.52 kg/m    Abdomen: soft, nontender, without hepatosplenomegaly or masses  : normal cervix, adnexae, and uterus without masses or discharge    IUD REMOVAL  PROCEDURE;  IUD strings were visualized in cervical os, ring forceps was placed on strings and firm pull successfully expelled the IUD intact without any discomfort to patient.       IUD INSERTION;      IUD type: Mirena  Lot # [id number]  MIRENA: NDC# 73337-044-74    Procedure:  Uterus assessed for position and is Retroverted.  Speculum inserted.  Betadine prep of cervix done.  Tenaculum applied at 12 o'clock position and gentle traction was applied to elongate the cervical canal.  Uterus sounded to 8 cm's.  Cervical dilators not used .   IUD inserted in the usual fashion without problems, ie: resistance, severe protracted pain or excessive bleeding.  Tenaculum was removed with scant bleeding from the tenaculum site that was managed with some direct pressure using Ayala swabs.  Strings trimmed to 2 cm's.  Patient tolerated the procedure very  well without any prolonged pain or syncopy.    ASSESSMENT/ PLAN:  IUD removal   IUD insertion.  GC/Chlamydia Screening:  NO     Instructions given to patient regarding checking IUD strings, returning to the clinic if pain or inability to check strings and/or irregular bleeding.  Pt to [facility] next year for annual exam, pt knows warning signs as has had IUD for many years.     Bernice Freeman CNM

## 2020-03-03 NOTE — NURSING NOTE
"Chief Complaint   Patient presents with     IUD     removal and replace mirena NDC: 24290-315-81, LOT: [id number], exp: 2022       Initial BP 97/64   Pulse 74   Wt 60.3 kg (133 lb)   BMI 20.52 kg/m   Estimated body mass index is 20.52 kg/m  as calculated from the following:    Height as of 18: 1.715 m (5' 7.5\").    Weight as of this encounter: 60.3 kg (133 lb).  BP completed using cuff size: regular    Questioned patient about current smoking habits.  Pt. has never smoked.          The following HM Due: NONE      The following patient reported/Care Every where data was sent to:  P ABSTRACT QUALITY INITIATIVES [[id number]]        patient has appointment for today  Julienne Valdez                "

## 2020-03-22 ENCOUNTER — HEALTH MAINTENANCE LETTER (OUTPATIENT)
Age: 45
End: 2020-03-22

## 2021-01-15 ENCOUNTER — HEALTH MAINTENANCE LETTER (OUTPATIENT)
Age: 46
End: 2021-01-15

## 2021-01-24 ENCOUNTER — HEALTH MAINTENANCE LETTER (OUTPATIENT)
Age: 46
End: 2021-01-24

## 2021-03-06 ENCOUNTER — APPOINTMENT (OUTPATIENT)
Dept: GENERAL RADIOLOGY | Facility: CLINIC | Age: 46
End: 2021-03-06
Attending: EMERGENCY MEDICINE
Payer: COMMERCIAL

## 2021-03-06 ENCOUNTER — HOSPITAL ENCOUNTER (EMERGENCY)
Facility: CLINIC | Age: 46
Discharge: HOME | End: 2021-03-06
Attending: EMERGENCY MEDICINE | Admitting: EMERGENCY MEDICINE
Payer: COMMERCIAL

## 2021-03-06 VITALS
TEMPERATURE: 97.9 F | SYSTOLIC BLOOD PRESSURE: 108 MMHG | OXYGEN SATURATION: 98 % | HEART RATE: 61 BPM | RESPIRATION RATE: 20 BRPM | WEIGHT: 130 LBS | BODY MASS INDEX: 19.7 KG/M2 | HEIGHT: 68 IN | DIASTOLIC BLOOD PRESSURE: 67 MMHG

## 2021-03-06 DIAGNOSIS — R06.02 SHORTNESS OF BREATH: ICD-10-CM

## 2021-03-06 LAB
ALBUMIN SERPL-MCNC: 4.1 G/DL (ref 3.4–5)
ALP SERPL-CCNC: 52 U/L (ref 40–150)
ALT SERPL W P-5'-P-CCNC: 16 U/L (ref 0–50)
ANION GAP SERPL CALCULATED.3IONS-SCNC: 5 MMOL/L (ref 3–14)
AST SERPL W P-5'-P-CCNC: 10 U/L (ref 0–45)
BASOPHILS # BLD AUTO: 0 10E9/L (ref 0–0.2)
BASOPHILS NFR BLD AUTO: 0.7 %
BILIRUB SERPL-MCNC: 0.6 MG/DL (ref 0.2–1.3)
BUN SERPL-MCNC: 10 MG/DL (ref 7–30)
CALCIUM SERPL-MCNC: 8.6 MG/DL (ref 8.5–10.1)
CHLORIDE SERPL-SCNC: 108 MMOL/L (ref 94–109)
CO2 SERPL-SCNC: 30 MMOL/L (ref 20–32)
CREAT SERPL-MCNC: 0.91 MG/DL (ref 0.52–1.04)
D DIMER PPP FEU-MCNC: 0.3 UG/ML FEU (ref 0–0.5)
DIFFERENTIAL METHOD BLD: NORMAL
EOSINOPHIL # BLD AUTO: 0.1 10E9/L (ref 0–0.7)
EOSINOPHIL NFR BLD AUTO: 1.5 %
ERYTHROCYTE [DISTWIDTH] IN BLOOD BY AUTOMATED COUNT: 12 % (ref 10–15)
FLUAV RNA RESP QL NAA+PROBE: NEGATIVE
FLUBV RNA RESP QL NAA+PROBE: NEGATIVE
GFR SERPL CREATININE-BSD FRML MDRD: 75 ML/MIN/{1.73_M2}
GLUCOSE SERPL-MCNC: 90 MG/DL (ref 70–99)
HCT VFR BLD AUTO: 39.5 % (ref 35–47)
HGB BLD-MCNC: 13.1 G/DL (ref 11.7–15.7)
IMM GRANULOCYTES # BLD: 0 10E9/L (ref 0–0.4)
IMM GRANULOCYTES NFR BLD: 0.2 %
INTERPRETATION ECG - MUSE: NORMAL
LABORATORY COMMENT REPORT: NORMAL
LIPASE SERPL-CCNC: 121 U/L (ref 73–393)
LYMPHOCYTES # BLD AUTO: 1.4 10E9/L (ref 0.8–5.3)
LYMPHOCYTES NFR BLD AUTO: 31.4 %
MCH RBC QN AUTO: 30.3 PG (ref 26.5–33)
MCHC RBC AUTO-ENTMCNC: 33.2 G/DL (ref 31.5–36.5)
MCV RBC AUTO: 91 FL (ref 78–100)
MONOCYTES # BLD AUTO: 0.5 10E9/L (ref 0–1.3)
MONOCYTES NFR BLD AUTO: 10.2 %
NEUTROPHILS # BLD AUTO: 2.6 10E9/L (ref 1.6–8.3)
NEUTROPHILS NFR BLD AUTO: 56 %
NRBC # BLD AUTO: 0 10*3/UL
NRBC BLD AUTO-RTO: 0 /100
PLATELET # BLD AUTO: 249 10E9/L (ref 150–450)
POTASSIUM SERPL-SCNC: 3.8 MMOL/L (ref 3.4–5.3)
PROT SERPL-MCNC: 6.8 G/DL (ref 6.8–8.8)
RBC # BLD AUTO: 4.32 10E12/L (ref 3.8–5.2)
RSV RNA SPEC QL NAA+PROBE: NORMAL
SARS-COV-2 RNA RESP QL NAA+PROBE: NEGATIVE
SODIUM SERPL-SCNC: 143 MMOL/L (ref 133–144)
SPECIMEN SOURCE: NORMAL
TROPONIN I SERPL-MCNC: <0.015 UG/L (ref 0–0.04)
TROPONIN I SERPL-MCNC: <0.015 UG/L (ref 0–0.04)
WBC # BLD AUTO: 4.6 10E9/L (ref 4–11)

## 2021-03-06 PROCEDURE — 99285 EMERGENCY DEPT VISIT HI MDM: CPT | Mod: 25

## 2021-03-06 PROCEDURE — 85379 FIBRIN DEGRADATION QUANT: CPT | Performed by: EMERGENCY MEDICINE

## 2021-03-06 PROCEDURE — 84484 ASSAY OF TROPONIN QUANT: CPT | Performed by: EMERGENCY MEDICINE

## 2021-03-06 PROCEDURE — 80053 COMPREHEN METABOLIC PANEL: CPT | Performed by: EMERGENCY MEDICINE

## 2021-03-06 PROCEDURE — 71046 X-RAY EXAM CHEST 2 VIEWS: CPT

## 2021-03-06 PROCEDURE — 83690 ASSAY OF LIPASE: CPT | Performed by: EMERGENCY MEDICINE

## 2021-03-06 PROCEDURE — 93005 ELECTROCARDIOGRAM TRACING: CPT

## 2021-03-06 PROCEDURE — C9803 HOPD COVID-19 SPEC COLLECT: HCPCS

## 2021-03-06 PROCEDURE — 87636 SARSCOV2 & INF A&B AMP PRB: CPT | Performed by: EMERGENCY MEDICINE

## 2021-03-06 PROCEDURE — 85025 COMPLETE CBC W/AUTO DIFF WBC: CPT | Performed by: EMERGENCY MEDICINE

## 2021-03-06 ASSESSMENT — MIFFLIN-ST. JEOR: SCORE: 1283.18

## 2021-03-06 ASSESSMENT — ENCOUNTER SYMPTOMS
SHORTNESS OF BREATH: 1
FEVER: 0
ABDOMINAL PAIN: 1
LIGHT-HEADEDNESS: 1
BACK PAIN: 1
COUGH: 0
CHEST TIGHTNESS: 1

## 2021-03-06 NOTE — ED PROVIDER NOTES
History   Chief Complaint:  Chest Pain       HPI   Lilly Peres is a 45 year old female with history of pneumonia who presents with chest tightness and back pain. Patient reports that starting on Wednesday she developed a chest tightness and shortness of breath while she was working out on her [employer]. She thought her symptoms were just seasonal allergies or related to work stress. Today, she developed a burning sensation on her back located behind her left shoulder. Additionally, she has still had her shortness of breath and has had a stomach ache. She reports having had some light headedness. She denies any fever, cough, or recent covid exposure. She further denies any pain or swelling in the legs or family history of DVT.    The patient and her  deny any recent travel and state that their last vacation they got back from [place] at the beginning of February. The patient says she doesn't usually eat a lot of sugar and wonders if eating more dark chocolate lately could be causing her symptoms. She denies any history of cancer. She denies any use of hormone medications.    Review of Systems   Constitutional: Negative for fever.   Respiratory: Positive for chest tightness and shortness of breath. Negative for cough.    Cardiovascular: Negative for leg swelling.   Gastrointestinal: Positive for abdominal pain.   Musculoskeletal: Positive for back pain (burning sensation behind left shoulder).        Negative for leg pain.   Neurological: Positive for light-headedness.   10 systems reviewed and negative except as above and in HPI.      Allergies:  The patient has no known allergies.     Medications:  Mirena  Synthroid/Levothroid  Zyrtec  Deltasone  Vitamin supplements    Past Medical History:    Diffuse cystic mastopathy  Hypothyroidism  Iliotibial band syndrome  IUD   Sensorineural hearing loss of left ear    Past Surgical History:    Puncture aspiration cyst of right breast  Left breast reduction    "Excise breast cyst  Breast biopsy    Family History:    Gastrointestinal disease  Hypertension  Lipids  Thyroid disease  Depression  Diabetes  Rheum arthritis  Breast Cancer    Social History:  Patient does not usually drink and denies any tobacco use.  She lives with her  and children.  She works at [employer] and her  is a realtor.    Physical Exam     Patient Vitals for the past 24 hrs:   BP Temp Temp src Pulse Resp SpO2 Height Weight   03/06/21 1542 108/67 -- -- 61 -- 98 % -- --   03/06/21 1122 130/63 97.9  F (36.6  C) Oral 91 20 100 % 1.727 m (5' 8\") 59 kg (130 lb)       Physical Exam    General: Resting on the gurney, appears uncomfortable  Head:  The scalp, face, and head appear normal  Mouth/Throat: Mucus membranes are moist  CV:  Regular rate    Normal S1 and S2  No pathological murmur     Slight tenderness to palpation of left posterior lateral chest wall  Resp:  Breath sounds clear and equal bilaterally    Non-labored, no retractions or accessory muscle use    No coarseness    No wheezing   GI:  Abdomen is soft, no rigidity    No tenderness to palpation  MS:  Normal motor assessment of all extremities.    Good capillary refill noted.    No lower extremity redness or swelling  Skin:  Slight redness in the area of tenderness, no vesicles or lesions, pt reports she was rubbing this area.  Redness resolved on recheck.  No rash or lesions noted.  Neuro:  Speech is normal and fluent. No apparent deficit.  Psych:  Awake. Alert.  Normal affect.      Appropriate interactions.    Emergency Department Course     ECG  ECG taken at 1129, ECG read at 1138  Normal sinus rhythm with sinus arrythmia  Nonspecific ST abnormality  Abnormal ECG   Rate 81 bpm. NH interval 122 ms. QRS duration 72 ms. QT/QTc 380/441 ms. P-R-T axes 65 46 31.     Imaging:    Chest XR,  PA & LAT  Negative exam.  Reading per radiology    Laboratory:  CBC: WBC 4.6, HGB 13.1,   CMP: AWNL (Creatinine: 0.91)   Troponin (Collected 1202): " <0.015  Troponin (Collected 1410): <0.015  D Dimer (Collected 1202): 0.3  Lipase: 121    Symptomatic Influenza A/B & SARS-CoV-2 Virus PCR Multiplex: Negative    Emergency Department Course:    Reviewed:  I reviewed nursing notes, vitals, past medical history and care everywhere    Assessments:  1138 I obtained history and examined the patient as noted above.   1540 I rechecked the patient and explained findings.    Disposition:  The patient was discharged to home.       Impression & Plan     Medical Decision Making:  Lilly Peres is a 45 year old female who presents for evaluation of chest pain. Initial laboratory and imaging tests have come back normal. There is no clinical, laboratory, or radiographic evidence of pulmonary embolism, aortic dissection, pneumonia, pneumothorax or cardiac ischemia.  Other etiologies of chest pain considered in this patient included chest wall source, esophageal spasm or GI source, pleuritis, referred pain, etc.  My suspicion of unstable angina at this point is very low and given risk/benefit ratio would not start lovenox or heparin. Given the nature and timing of the patient's symptoms, I am comfortable discharging the patient without repeat troponin as the pain has been ongoing and was maximal in intensity > 6 hours ago.  The possibility of cardiac etiology was discussed and the patient is aware of the imprtance of following up with stress test.  This was ordered from the ED and will be performed within 48 hours. The patient is comfortable with this plan and eager for discharge to home.  All questions answered and return precautions given.     Covid-19  Lilly Peres was evaluated during a global COVID-19 pandemic, which necessitated consideration that the patient might be at risk for infection with the SARS-CoV-2 virus that causes COVID-19.   Applicable protocols for evaluation were followed during the patient's care.   COVID-19 was considered as part of the patient's  evaluation. The plan for testing is:  a test was obtained during this visit.    Diagnosis:    ICD-10-CM    1. Shortness of breath  R06.02 Echo Stress Echocardiogram         Scribe Disclosure:  I, Doe Arredondo, am serving as a scribe at 11:39 AM on 3/6/2021 to document services personally performed by Mireya Weir MD based on my observations and the provider's statements to me.              Mireya Weir MD  03/07/21 [id number]

## 2021-03-06 NOTE — DISCHARGE INSTRUCTIONS
Discharge Instructions     At this time, your provider has found no signs that your symptoms are due to a serious or life-threatening condition, (or you have declined more testing and/or admission to the hospital). However, sometimes there is a serious problem that does not show up right away. Your evaluation today may not be complete and you may need further testing and evaluation.     Generally, every Emergency Department visit should have a follow-up clinic visit with either a primary or a specialty clinic/provider. Please follow-up as instructed by your emergency provider today.  Return to the Emergency Department if:  You get very weak or tired.  You pass out or faint.  You have any new symptoms, like fever, cough, numb legs, or you cough up blood.  You have anything else that worries you.    Until you follow-up with your regular provider, please do the following:  Take one aspirin daily unless you have an allergy or are told not to by your provider.  If a stress test appointment has been made, go to the appointment.  If you have questions, contact your regular provider.  Follow-up with your regular provider/clinic as directed; this is very important.    If you were given a prescription for medicine here today, be sure to read all of the information (including the package insert) that comes with your prescription.  This will include important information about the medicine, its side effects, and any warnings that you need to know about.  The pharmacist who fills the prescription can provide more information and answer questions you may have about the medicine.  If you have questions or concerns that the pharmacist cannot address, please call or return to the Emergency Department.       Remember that you can always come back to the Emergency Department if you are not able to see your regular provider in the amount of time listed above, if you get any new symptoms, or if there is anything that worries you.

## 2021-03-21 ENCOUNTER — HEALTH MAINTENANCE LETTER (OUTPATIENT)
Age: 46
End: 2021-03-21

## 2021-05-16 ENCOUNTER — HEALTH MAINTENANCE LETTER (OUTPATIENT)
Age: 46
End: 2021-05-16

## 2021-09-05 ENCOUNTER — HEALTH MAINTENANCE LETTER (OUTPATIENT)
Age: 46
End: 2021-09-05

## 2022-02-20 ENCOUNTER — HEALTH MAINTENANCE LETTER (OUTPATIENT)
Age: 47
End: 2022-02-20

## 2022-06-12 ENCOUNTER — HEALTH MAINTENANCE LETTER (OUTPATIENT)
Age: 47
End: 2022-06-12

## 2022-10-23 ENCOUNTER — HEALTH MAINTENANCE LETTER (OUTPATIENT)
Age: 47
End: 2022-10-23

## 2023-04-02 ENCOUNTER — HEALTH MAINTENANCE LETTER (OUTPATIENT)
Age: 48
End: 2023-04-02

## 2023-06-18 ENCOUNTER — HEALTH MAINTENANCE LETTER (OUTPATIENT)
Age: 48
End: 2023-06-18